# Patient Record
Sex: FEMALE | Race: OTHER | NOT HISPANIC OR LATINO
[De-identification: names, ages, dates, MRNs, and addresses within clinical notes are randomized per-mention and may not be internally consistent; named-entity substitution may affect disease eponyms.]

---

## 2017-06-15 ENCOUNTER — APPOINTMENT (OUTPATIENT)
Dept: OBGYN | Facility: CLINIC | Age: 27
End: 2017-06-15

## 2017-11-14 ENCOUNTER — RESULT REVIEW (OUTPATIENT)
Age: 27
End: 2017-11-14

## 2017-11-14 ENCOUNTER — APPOINTMENT (OUTPATIENT)
Dept: OBGYN | Facility: CLINIC | Age: 27
End: 2017-11-14
Payer: COMMERCIAL

## 2017-11-14 PROCEDURE — 99395 PREV VISIT EST AGE 18-39: CPT

## 2017-11-19 ENCOUNTER — TRANSCRIPTION ENCOUNTER (OUTPATIENT)
Age: 27
End: 2017-11-19

## 2019-01-03 ENCOUNTER — APPOINTMENT (OUTPATIENT)
Dept: OBGYN | Facility: CLINIC | Age: 29
End: 2019-01-03
Payer: COMMERCIAL

## 2019-01-03 ENCOUNTER — RESULT REVIEW (OUTPATIENT)
Age: 29
End: 2019-01-03

## 2019-01-03 PROCEDURE — 99395 PREV VISIT EST AGE 18-39: CPT

## 2020-01-08 ENCOUNTER — APPOINTMENT (OUTPATIENT)
Dept: OBGYN | Facility: CLINIC | Age: 30
End: 2020-01-08

## 2020-03-17 ENCOUNTER — APPOINTMENT (OUTPATIENT)
Dept: OBGYN | Facility: CLINIC | Age: 30
End: 2020-03-17

## 2021-12-17 ENCOUNTER — TRANSCRIPTION ENCOUNTER (OUTPATIENT)
Age: 31
End: 2021-12-17

## 2021-12-18 ENCOUNTER — RESULT REVIEW (OUTPATIENT)
Age: 31
End: 2021-12-18

## 2021-12-18 ENCOUNTER — INPATIENT (INPATIENT)
Facility: HOSPITAL | Age: 31
LOS: 0 days | Discharge: HOME | DRG: 743 | End: 2021-12-18
Attending: OBSTETRICS & GYNECOLOGY | Admitting: OBSTETRICS & GYNECOLOGY
Payer: COMMERCIAL

## 2021-12-18 VITALS
SYSTOLIC BLOOD PRESSURE: 124 MMHG | HEIGHT: 63 IN | DIASTOLIC BLOOD PRESSURE: 76 MMHG | TEMPERATURE: 98 F | HEART RATE: 71 BPM | RESPIRATION RATE: 20 BRPM | WEIGHT: 147.05 LBS | OXYGEN SATURATION: 99 %

## 2021-12-18 VITALS
TEMPERATURE: 98 F | RESPIRATION RATE: 18 BRPM | SYSTOLIC BLOOD PRESSURE: 108 MMHG | HEART RATE: 80 BPM | OXYGEN SATURATION: 100 % | DIASTOLIC BLOOD PRESSURE: 60 MMHG

## 2021-12-18 DIAGNOSIS — N83.209 UNSPECIFIED OVARIAN CYST, UNSPECIFIED SIDE: ICD-10-CM

## 2021-12-18 LAB
ALBUMIN SERPL ELPH-MCNC: 4.4 G/DL — SIGNIFICANT CHANGE UP (ref 3.3–5)
ALP SERPL-CCNC: 57 U/L — SIGNIFICANT CHANGE UP (ref 40–120)
ALT FLD-CCNC: 11 U/L — SIGNIFICANT CHANGE UP (ref 10–45)
ANION GAP SERPL CALC-SCNC: 17 MMOL/L — SIGNIFICANT CHANGE UP (ref 5–17)
APPEARANCE UR: CLEAR — SIGNIFICANT CHANGE UP
APTT BLD: 29.7 SEC — SIGNIFICANT CHANGE UP (ref 27.5–35.5)
AST SERPL-CCNC: 15 U/L — SIGNIFICANT CHANGE UP (ref 10–40)
BACTERIA # UR AUTO: NEGATIVE — SIGNIFICANT CHANGE UP
BASOPHILS # BLD AUTO: 0.04 K/UL — SIGNIFICANT CHANGE UP (ref 0–0.2)
BASOPHILS NFR BLD AUTO: 0.6 % — SIGNIFICANT CHANGE UP (ref 0–2)
BILIRUB SERPL-MCNC: 0.3 MG/DL — SIGNIFICANT CHANGE UP (ref 0.2–1.2)
BILIRUB UR-MCNC: NEGATIVE — SIGNIFICANT CHANGE UP
BLD GP AB SCN SERPL QL: NEGATIVE — SIGNIFICANT CHANGE UP
BUN SERPL-MCNC: 10 MG/DL — SIGNIFICANT CHANGE UP (ref 7–23)
CALCIUM SERPL-MCNC: 9.1 MG/DL — SIGNIFICANT CHANGE UP (ref 8.4–10.5)
CHLORIDE SERPL-SCNC: 103 MMOL/L — SIGNIFICANT CHANGE UP (ref 96–108)
CO2 SERPL-SCNC: 17 MMOL/L — LOW (ref 22–31)
COLOR SPEC: SIGNIFICANT CHANGE UP
CREAT SERPL-MCNC: 0.76 MG/DL — SIGNIFICANT CHANGE UP (ref 0.5–1.3)
DIFF PNL FLD: NEGATIVE — SIGNIFICANT CHANGE UP
EOSINOPHIL # BLD AUTO: 0.09 K/UL — SIGNIFICANT CHANGE UP (ref 0–0.5)
EOSINOPHIL NFR BLD AUTO: 1.3 % — SIGNIFICANT CHANGE UP (ref 0–6)
EPI CELLS # UR: 3 /HPF — SIGNIFICANT CHANGE UP
GLUCOSE SERPL-MCNC: 110 MG/DL — HIGH (ref 70–99)
GLUCOSE UR QL: NEGATIVE — SIGNIFICANT CHANGE UP
HCG SERPL-ACNC: <2 MIU/ML — SIGNIFICANT CHANGE UP
HCG UR QL: NEGATIVE — SIGNIFICANT CHANGE UP
HCT VFR BLD CALC: 39.5 % — SIGNIFICANT CHANGE UP (ref 34.5–45)
HGB BLD-MCNC: 13.2 G/DL — SIGNIFICANT CHANGE UP (ref 11.5–15.5)
HYALINE CASTS # UR AUTO: 0 /LPF — SIGNIFICANT CHANGE UP (ref 0–2)
IMM GRANULOCYTES NFR BLD AUTO: 0.3 % — SIGNIFICANT CHANGE UP (ref 0–1.5)
INR BLD: 1.04 RATIO — SIGNIFICANT CHANGE UP (ref 0.88–1.16)
KETONES UR-MCNC: SIGNIFICANT CHANGE UP
LEUKOCYTE ESTERASE UR-ACNC: NEGATIVE — SIGNIFICANT CHANGE UP
LYMPHOCYTES # BLD AUTO: 2.21 K/UL — SIGNIFICANT CHANGE UP (ref 1–3.3)
LYMPHOCYTES # BLD AUTO: 30.7 % — SIGNIFICANT CHANGE UP (ref 13–44)
MCHC RBC-ENTMCNC: 29.5 PG — SIGNIFICANT CHANGE UP (ref 27–34)
MCHC RBC-ENTMCNC: 33.4 GM/DL — SIGNIFICANT CHANGE UP (ref 32–36)
MCV RBC AUTO: 88.4 FL — SIGNIFICANT CHANGE UP (ref 80–100)
MONOCYTES # BLD AUTO: 0.53 K/UL — SIGNIFICANT CHANGE UP (ref 0–0.9)
MONOCYTES NFR BLD AUTO: 7.4 % — SIGNIFICANT CHANGE UP (ref 2–14)
NEUTROPHILS # BLD AUTO: 4.3 K/UL — SIGNIFICANT CHANGE UP (ref 1.8–7.4)
NEUTROPHILS NFR BLD AUTO: 59.7 % — SIGNIFICANT CHANGE UP (ref 43–77)
NITRITE UR-MCNC: NEGATIVE — SIGNIFICANT CHANGE UP
NRBC # BLD: 0 /100 WBCS — SIGNIFICANT CHANGE UP (ref 0–0)
PH UR: 8 — SIGNIFICANT CHANGE UP (ref 5–8)
PLATELET # BLD AUTO: 249 K/UL — SIGNIFICANT CHANGE UP (ref 150–400)
POTASSIUM SERPL-MCNC: 3.5 MMOL/L — SIGNIFICANT CHANGE UP (ref 3.5–5.3)
POTASSIUM SERPL-SCNC: 3.5 MMOL/L — SIGNIFICANT CHANGE UP (ref 3.5–5.3)
PROT SERPL-MCNC: 7.3 G/DL — SIGNIFICANT CHANGE UP (ref 6–8.3)
PROT UR-MCNC: ABNORMAL
PROTHROM AB SERPL-ACNC: 12.5 SEC — SIGNIFICANT CHANGE UP (ref 10.6–13.6)
RBC # BLD: 4.47 M/UL — SIGNIFICANT CHANGE UP (ref 3.8–5.2)
RBC # FLD: 11.9 % — SIGNIFICANT CHANGE UP (ref 10.3–14.5)
RBC CASTS # UR COMP ASSIST: 2 /HPF — SIGNIFICANT CHANGE UP (ref 0–4)
RH IG SCN BLD-IMP: POSITIVE — SIGNIFICANT CHANGE UP
SARS-COV-2 RNA SPEC QL NAA+PROBE: SIGNIFICANT CHANGE UP
SODIUM SERPL-SCNC: 137 MMOL/L — SIGNIFICANT CHANGE UP (ref 135–145)
SP GR SPEC: 1.02 — SIGNIFICANT CHANGE UP (ref 1.01–1.02)
UROBILINOGEN FLD QL: NEGATIVE — SIGNIFICANT CHANGE UP
WBC # BLD: 7.19 K/UL — SIGNIFICANT CHANGE UP (ref 3.8–10.5)
WBC # FLD AUTO: 7.19 K/UL — SIGNIFICANT CHANGE UP (ref 3.8–10.5)
WBC UR QL: 1 /HPF — SIGNIFICANT CHANGE UP (ref 0–5)

## 2021-12-18 PROCEDURE — 85610 PROTHROMBIN TIME: CPT

## 2021-12-18 PROCEDURE — 96376 TX/PRO/DX INJ SAME DRUG ADON: CPT

## 2021-12-18 PROCEDURE — 76830 TRANSVAGINAL US NON-OB: CPT | Mod: 26

## 2021-12-18 PROCEDURE — 80053 COMPREHEN METABOLIC PANEL: CPT

## 2021-12-18 PROCEDURE — 84702 CHORIONIC GONADOTROPIN TEST: CPT

## 2021-12-18 PROCEDURE — 81025 URINE PREGNANCY TEST: CPT

## 2021-12-18 PROCEDURE — 58662 LAPAROSCOPY EXCISE LESIONS: CPT

## 2021-12-18 PROCEDURE — 74177 CT ABD & PELVIS W/CONTRAST: CPT | Mod: 26,MA

## 2021-12-18 PROCEDURE — 99285 EMERGENCY DEPT VISIT HI MDM: CPT | Mod: 25

## 2021-12-18 PROCEDURE — 85730 THROMBOPLASTIN TIME PARTIAL: CPT

## 2021-12-18 PROCEDURE — 96375 TX/PRO/DX INJ NEW DRUG ADDON: CPT

## 2021-12-18 PROCEDURE — 93010 ELECTROCARDIOGRAM REPORT: CPT

## 2021-12-18 PROCEDURE — 86850 RBC ANTIBODY SCREEN: CPT

## 2021-12-18 PROCEDURE — 36415 COLL VENOUS BLD VENIPUNCTURE: CPT

## 2021-12-18 PROCEDURE — C1889: CPT

## 2021-12-18 PROCEDURE — 87086 URINE CULTURE/COLONY COUNT: CPT

## 2021-12-18 PROCEDURE — 74177 CT ABD & PELVIS W/CONTRAST: CPT | Mod: MA

## 2021-12-18 PROCEDURE — 88305 TISSUE EXAM BY PATHOLOGIST: CPT

## 2021-12-18 PROCEDURE — 93975 VASCULAR STUDY: CPT

## 2021-12-18 PROCEDURE — U0003: CPT

## 2021-12-18 PROCEDURE — 81001 URINALYSIS AUTO W/SCOPE: CPT

## 2021-12-18 PROCEDURE — 76830 TRANSVAGINAL US NON-OB: CPT

## 2021-12-18 PROCEDURE — 85025 COMPLETE CBC W/AUTO DIFF WBC: CPT

## 2021-12-18 PROCEDURE — 96374 THER/PROPH/DIAG INJ IV PUSH: CPT | Mod: XU

## 2021-12-18 PROCEDURE — 86901 BLOOD TYPING SEROLOGIC RH(D): CPT

## 2021-12-18 PROCEDURE — 93975 VASCULAR STUDY: CPT | Mod: 26

## 2021-12-18 PROCEDURE — 86900 BLOOD TYPING SEROLOGIC ABO: CPT

## 2021-12-18 PROCEDURE — 88305 TISSUE EXAM BY PATHOLOGIST: CPT | Mod: 26

## 2021-12-18 RX ORDER — ONDANSETRON 8 MG/1
4 TABLET, FILM COATED ORAL ONCE
Refills: 0 | Status: COMPLETED | OUTPATIENT
Start: 2021-12-18 | End: 2021-12-18

## 2021-12-18 RX ORDER — MORPHINE SULFATE 50 MG/1
4 CAPSULE, EXTENDED RELEASE ORAL ONCE
Refills: 0 | Status: DISCONTINUED | OUTPATIENT
Start: 2021-12-18 | End: 2021-12-18

## 2021-12-18 RX ORDER — SODIUM CHLORIDE 9 MG/ML
1000 INJECTION INTRAMUSCULAR; INTRAVENOUS; SUBCUTANEOUS ONCE
Refills: 0 | Status: COMPLETED | OUTPATIENT
Start: 2021-12-18 | End: 2021-12-18

## 2021-12-18 RX ORDER — OXYCODONE HYDROCHLORIDE 5 MG/1
5 TABLET ORAL ONCE
Refills: 0 | Status: DISCONTINUED | OUTPATIENT
Start: 2021-12-18 | End: 2021-12-18

## 2021-12-18 RX ORDER — OXYCODONE HYDROCHLORIDE 5 MG/1
1 TABLET ORAL
Qty: 5 | Refills: 0
Start: 2021-12-18

## 2021-12-18 RX ORDER — HYDROMORPHONE HYDROCHLORIDE 2 MG/ML
0.5 INJECTION INTRAMUSCULAR; INTRAVENOUS; SUBCUTANEOUS
Refills: 0 | Status: DISCONTINUED | OUTPATIENT
Start: 2021-12-18 | End: 2021-12-18

## 2021-12-18 RX ORDER — ONDANSETRON 8 MG/1
4 TABLET, FILM COATED ORAL ONCE
Refills: 0 | Status: DISCONTINUED | OUTPATIENT
Start: 2021-12-18 | End: 2021-12-18

## 2021-12-18 RX ORDER — ACETAMINOPHEN 500 MG
975 TABLET ORAL ONCE
Refills: 0 | Status: COMPLETED | OUTPATIENT
Start: 2021-12-18 | End: 2021-12-18

## 2021-12-18 RX ADMIN — MORPHINE SULFATE 4 MILLIGRAM(S): 50 CAPSULE, EXTENDED RELEASE ORAL at 15:38

## 2021-12-18 RX ADMIN — SODIUM CHLORIDE 1000 MILLILITER(S): 9 INJECTION INTRAMUSCULAR; INTRAVENOUS; SUBCUTANEOUS at 15:45

## 2021-12-18 RX ADMIN — MORPHINE SULFATE 4 MILLIGRAM(S): 50 CAPSULE, EXTENDED RELEASE ORAL at 13:38

## 2021-12-18 RX ADMIN — MORPHINE SULFATE 4 MILLIGRAM(S): 50 CAPSULE, EXTENDED RELEASE ORAL at 11:29

## 2021-12-18 RX ADMIN — MORPHINE SULFATE 4 MILLIGRAM(S): 50 CAPSULE, EXTENDED RELEASE ORAL at 10:35

## 2021-12-18 RX ADMIN — SODIUM CHLORIDE 1000 MILLILITER(S): 9 INJECTION INTRAMUSCULAR; INTRAVENOUS; SUBCUTANEOUS at 09:51

## 2021-12-18 RX ADMIN — MORPHINE SULFATE 4 MILLIGRAM(S): 50 CAPSULE, EXTENDED RELEASE ORAL at 17:36

## 2021-12-18 RX ADMIN — Medication 975 MILLIGRAM(S): at 09:55

## 2021-12-18 RX ADMIN — ONDANSETRON 4 MILLIGRAM(S): 8 TABLET, FILM COATED ORAL at 09:55

## 2021-12-18 RX ADMIN — Medication 975 MILLIGRAM(S): at 11:29

## 2021-12-18 RX ADMIN — SODIUM CHLORIDE 1000 MILLILITER(S): 9 INJECTION INTRAMUSCULAR; INTRAVENOUS; SUBCUTANEOUS at 15:38

## 2021-12-18 NOTE — ED PROVIDER NOTE - NS ED ATTENDING STATEMENT MOD
no
I have personally performed a face to face diagnostic evaluation on this patient. I have reviewed the ACP note and agree with the history, exam and plan of care, except as noted.

## 2021-12-18 NOTE — H&P ADULT - HISTORY OF PRESENT ILLNESS
R2 GYN ED CONSULT NOTE    SUBJECTIVE:    32yo G0, LMP ~3wks ago, presents with intermittent severe RLQ abdominal pain x 2 days. Patient states that the pain first began on Thursday night after having intercourse. At first the pain was dull and crampy and intermittnet but has progressed to "excruciating pain." Pain is also described as now constant and associated with nausea. No vomiting.      Pt denies fever, chills, vomiting, diarrhea, headache, constipation, dizziness, syncope, chest pain, palpitations, shortness of breath, dysuria, urgency, frequency.    Pt is from [place] and was traveling here to see her grandma.    In ED today patient received a total of 8mg of morphine for the pain.     OBH: G0  GYNH: +PCOS and AUB. denies h/o fibroids,STDs, or abnormal Pap smears  PMH: denies  PSH: wisdom teeth  Meds: OCPs  Social History:  Denies smoking use, drug use,  +occasional social alcohol use      PMSH:  PAST MEDICAL & SURGICAL HISTORY:      Allergies    Levaquin (Faint)    Intolerances        Medications:  Home:  MEDICATIONS  (STANDING):    MEDICATIONS  (PRN):        FAMILY HISTORY:                        OBJECTIVE:    VITAL SIGNS:  Vital Signs Last 24 Hrs  T(C): 36.9 (18 Dec 2021 13:34), Max: 37.3 (18 Dec 2021 11:28)  T(F): 98.5 (18 Dec 2021 13:34), Max: 99.2 (18 Dec 2021 11:28)  HR: 83 (18 Dec 2021 13:34) (67 - 83)  BP: 123/79 (18 Dec 2021 13:34) (101/78 - 124/76)  BP(mean): --  RR: 17 (18 Dec 2021 13:34) (17 - 20)  SpO2: 100% (18 Dec 2021 13:34) (98% - 100%)  Height (cm): 160 (21 @ 09:14)  Weight (kg): 66.7 (21 @ 09:14)  BMI (kg/m2): 26.1 (21 @ 09:14)  BSA (m2): 1.7 (21 @ 09:14)  CAPILLARY BLOOD GLUCOSE            Physical Exam:   General: laying comftorably in bed, NAD   CV: RR S1S2 no m/r/g  Lungs: CTA b/l, good air flow b/l   Abd: Soft, + tenderness in the RLQ, non-distended.  Bowel sounds present.    :  No bleeding on pad.    External labia wnl.  Bimanual exam with right adnexal tenderness.    Ext: non-tender b/l, no edema     LABS:                        13.2   7.19  )-----------( 249      ( 18 Dec 2021 09:58 )             39.5   baso 0.6    eos 1.3    imm gran 0.3    lymph 30.7   mono 7.4    poly 59.7           137  |  103  |  10  ----------------------------<  110<H>  3.5   |  17<L>  |  0.76    Ca    9.1      18 Dec 2021 09:58    TPro  7.3  /  Alb  4.4  /  TBili  0.3  /  DBili  x   /  AST  15  /  ALT  11  /  AlkPhos  57        Urinalysis Basic - ( 18 Dec 2021 10:36 )    Color: Light Yellow / Appearance: Clear / S.022 / pH: x  Gluc: x / Ketone: Trace  / Bili: Negative / Urobili: Negative   Blood: x / Protein: Trace / Nitrite: Negative   Leuk Esterase: Negative / RBC: 2 /hpf / WBC 1 /HPF   Sq Epi: x / Non Sq Epi: 3 /hpf / Bacteria: Negative        RADIOLOGY:  < from: US Doppler Pelvis (21 @ 13:26) >    ACC: [id number] EXAM:  US DPLX PELVIC                        ACC: [id number] EXAM:  US TRANSVAGINAL                          PROCEDURE DATE:  2021          INTERPRETATION:  CLINICAL INFORMATION: Right lower quadrant pain for 2   days.    LMP: 2021.    COMPARISON: CT abdomen and pelvis 2021.    TECHNIQUE:  Endovaginal pelvic sonogram only. Color and Spectral Doppler was   performed.    FINDINGS:    Uterus: 7.8 x 3.4 x 4.3 cm. Within normal limits.  Endometrium: 6 mm. Within normal limits.    Right ovary: 6.7 x 5.0 x 4.9. A 4.4 x 3.2 x 3.8 cm complex cystic lesion   with peripheral echogenic material with convex margins. Normal arterial   and venous waveforms.  Left ovary: 3.6 x 2.8 x 2.2. Within normal limits. Normal arterial and   venous waveforms.    Fluid: Small to moderate.    IMPRESSION:  There is a 4.4 cm right ovarian complex cystic lesion, likely a   hemorrhagic cyst. Six-week follow-up pelvic ultrasound is recommended to   assure resolution.    Small to moderate free fluid in the pelvis.        --- End of Report ---           HARRY MCCOY MD; Resident Radiology  This document has been electronically signed.  EDER HORNER MD; Attending Radiologist  This document has been electronically signed. Dec 18 20599:50PM    < end of copied text >

## 2021-12-18 NOTE — ASU DISCHARGE PLAN (ADULT/PEDIATRIC) - PROVIDER TOKENS
FREE:[LAST:[clinic],PHONE:[(   )    -],FAX:[(   )    -],ADDRESS:[[address] [place] [address], [address] [place] [address].  Phone: [phone]]]

## 2021-12-18 NOTE — ED PROVIDER NOTE - CPE EDP GU NORM
----- Message from Moon Oconnell CNP sent at 8/11/2020  2:18 PM CDT -----  Results reviewed.  - patient should increase atorvastatin to 20 mg nightly and repeat his cholesterol panel in 10-12 weeks. He should also be educated on increasing healthy fat intake including fish, nuts, avocados, extra virgin olive oils, olives, etc since his HDL is 35. Weight loss and exercise and avoidance of refined sugars and processed foods will help lower triglyceride levels.  
I called spoke w pt in regards to his blood work results   He is aware of CNP recommendations    He stated that he has not started the atorvastatin because his wife told him that once he's on that medication, you are on it forever. Pt wanted to know if that is true, and if there is an alternant alfredo medication that he can try    Please assist   
That is his choice of course.  
The patient stated he understands that the atorvastatin is the medication recommendation.  He stated he will change his diet, exercise, and understands to have his cholesterol levels checked.  The lipid panel will be entered for 11/2020. He stated he will have it drawn in November at [facility]..   
The patient's voice message box was full and unable to leave a message.  
Yes this is true and there is not an alternative medication.  
- - -

## 2021-12-18 NOTE — ED PROVIDER NOTE - ATTENDING CONTRIBUTION TO CARE
32yo f hx PCOS on OCPs LMP 3 weeks ago presents with RLQ abd pain x 2 days. States pain was mild that increased with intercourse. Since then pain is sharp and radiates to upper abdomen and flank and intermittent. Pt has been taking Ibuprofen w intermittent relief. Pain recurred this morning, took 2 ibuprofen prior to arrival. +nausea, no vomiting/diarrhea. + flatus. No discharge, mild dysuria. No f/c.   No abd surgery. Family Hx Kidney stones.   On exam, Patient is awake, alert and oriented x 3.  Patient is well appearing and in no acute distress.  NCAT  Neck is supple, No LAD.  Lungs are CTA B/L,+S1S2 no murmurs,  Abdomen:Soft nd/+ RLQ tender +bs no rebound or guarding. No CVAT.  Extremity no edema or calf tender.  Skin with no rash.  Neuro CN3-12 intact. Strength 5/5 in upper and lower extremities. Nml Sensation.Gait normal.   Check labs, US and CT to eval. Pain control and re eval.

## 2021-12-18 NOTE — ED PROVIDER NOTE - PROGRESS NOTE DETAILS
Pt stated feeling better after morphine and she went to CT. Pt went to US. Patient is reassessed, states feeling much better at this time. Pt updated with CT results, patient went to US. RGUJJAIME Pt stated the pain's back and unable to bear it now. VSS. Morphine given. GYN at bedside.

## 2021-12-18 NOTE — ASU DISCHARGE PLAN (ADULT/PEDIATRIC) - NS MD DC FALL RISK RISK
For information on Fall & Injury Prevention, visit: [email or web address] OR  [email or web address] OR  https://www.cdc.gov/steadi/patient.html

## 2021-12-18 NOTE — H&P ADULT - ATTENDING COMMENTS
ATTG note    Pt seen with and agree with above PGY2 note    32 yo P0 LMP 3 wks ago presents to ED for pelvic pain x 3 days.  Pain was mild and controllable with po pain meds, exacerbated with intercourse, and has become progressively worse today.  Pt required Morphine in ED.  Pt rates pain going from 7 at its best and then 11 at its worse.  GYN hx s/f PCOS on OCPs.  Follows with GYN in [place].  Pt traveled to see family in [place] today.    VSS, afebrile  Gen-NAD at this time  ABd-soft, nd, TTP, no rebound and no guarding  Pelvic exam - as above    labs-  WBC-7  H/H-13/39  UCG neg  COVID neg  CT abd/pelvis - normal appendix, rigth adnexa - 6.6 x 4.4 cm  TVS-Right adnexa - 6.7, complex cyst 4.4 cm  normal flow bilaterally  Small to moderate free fluid    a/p:32 yo P0 with acute onset of pelvic pain that was intermittent and controlled with po pain meds now with worsening pain that is 11 at its worst with large right adnexa - clinical picture concerning for torsion due to pain requiring narcotics and with large adnexal mass.  d/w pt that only way to definitively dx would be surgically with Dx LSC.  Pt amenable to surgical intervention.  Pt consented for and risks d/w pt for Dx LSC, detorsion, possible cystectomy, possible oopherectomy, possible ex-lap - risks including bleeding, infection, injury to surrounding blood vessels or organs like intestines, bladder, accepts blood.  All questions answered.  Informed consent signed.  -OR for Dx LSC  -NPO  -IVF  -Type on hold  -Dispo-for dc to PACU and then home    ANA Castellano

## 2021-12-18 NOTE — BRIEF OPERATIVE NOTE - NSICDXBRIEFPROCEDURE_GEN_ALL_CORE_FT
PROCEDURES:  Laparoscopic detorsion of ovary 18-Dec-2021 21:11:39  Kapil Ruiz  Laparoscopic left ovarian cystectomy 18-Dec-2021 21:11:58  Kapil Ruiz   PROCEDURES:  Laparoscopic detorsion of ovary 18-Dec-2021 21:11:39  Kapil Ruiz  Laparoscopic right ovarian cystectomy 21-Dec-2021 10:33:35  Kapil Ruiz

## 2021-12-18 NOTE — PRE-ANESTHESIA EVALUATION ADULT - NSANTHPMHFT_GEN_ALL_CORE
31F hx of PCOS found to have right ovarian torsion. Ynesly denies n/v, abd pain after medication. Denies CP, SOB

## 2021-12-18 NOTE — BRIEF OPERATIVE NOTE - OPERATION/FINDINGS
LSC: hemostatic entry  Pelvic survey: ovarian torsion x 2. +2-3cm simple left cyst with clear fluid. Ovary was noted to be purple and edematous prior to detorsion, marked improvement in color prior to the end of the case. Dilated left fallopian tube that was well perfused appearing. Grossly normal uterus, right fallopian tube, and right ovary.  Abd survey: grossly normal omentum, liver edge, and small bowel. LSC: hemostatic entry  Pelvic survey: ovarian torsion x 2. +2-3cm simple right cyst with clear fluid. Ovary was noted to be purple and edematous prior to detorsion, marked improvement in color prior to the end of the case. Dilated right fallopian tube that was well perfused appearing. Grossly normal uterus, right fallopian tube, and right ovary.  Abd survey: grossly normal omentum, liver edge, and small bowel.

## 2021-12-18 NOTE — CHART NOTE - NSCHARTNOTEFT_GEN_A_CORE
Gyn Post Op Check    Patient seen and examined at bedside, recently post-op. No acute complaints at this time. Denies CP, SOB, N/V, fevers, and chills.    Vital Signs Last 24 Hours  T(C): 36.5 (12-18-21 @ 23:00), Max: 37.4 (12-18-21 @ 17:21)  HR: 80 (12-18-21 @ 23:00) (67 - 118)  BP: 108/60 (12-18-21 @ 23:00) (101/78 - 130/70)  RR: 18 (12-18-21 @ 23:00) (17 - 20)  SpO2: 100% (12-18-21 @ 23:00) (97% - 100%)    I&O's Summary    18 Dec 2021 07:01  -  18 Dec 2021 23:25  --------------------------------------------------------  IN: 486 mL / OUT: 250 mL / NET: 236 mL    Physical Exam:  General: NAD  CV: NR, RR, S1, S2, no M/R/G  Lungs: CTA-B  Abdomen: Soft, appropriately tender, non-distended, +BS  Incision: 3 port site incision, CDI  Ext: No pain or swelling    Labs:             13.2   7.19  )-----------( 249      ( 12-18 @ 09:58 )             39.5     MEDICATIONS  (PRN):  HYDROmorphone  Injectable 0.5 milliGRAM(s) IV Push every 10 minutes PRN Moderate Pain (4 - 6)  ondansetron Injectable 4 milliGRAM(s) IV Push once PRN Nausea and/or Vomiting  oxyCODONE    IR 5 milliGRAM(s) Oral once PRN Moderate Pain (4 - 6)    32 yo G0 presenting w/ abdominal pain taken to OR for ovarian detorsion and cystectomy    Neuro: c/w tylenol, motrin  CV: VSS  Pulm: Saturating well on room air, encourage incentive spirometry  GI: regular diet  : spontaneously voiding  Heme: early ambulation  Dispo: Continue routine post-op care, clear for d/c home    d/w Dr. Esquivel  [facility] PGY3

## 2021-12-18 NOTE — ED PROVIDER NOTE - GENITOURINARY BLADDER
Brown PGY2: Informed that Dr Vivas admits to Dr Nixon, called Dr Nixon and pt accepted, pt pending cards eval- want to assess lasix in setting of aortic stenosis w/ cards prior to tx, pt stable, sats ok on ra and pt not in any resp distress , pending repeat trop non-tender/non-distended spoke to Dr Domingo who will consult on behalf of Dr Vivas, called HF team to assess bc she has been seen by Dr Kendrick in the past

## 2021-12-18 NOTE — ED PROVIDER NOTE - OBJECTIVE STATEMENT
32yo female pt with PMHx of PCOS (no med), no PSHx presents to ED with RLQ pain with intermittent nausea for 2days. Pt stated she noticed mild pain to right pelvic area intermittently 2days ago and the pain's gradually been worse with radiating pain to RLQ/flank area. Denies fever, chills, cough or congestion. Denies urinary problems. Denies vaginal discharge or bleeding. LMP- 3 weeks ago. Denies CP/SOB. +Family hx of Kidney stone.

## 2021-12-18 NOTE — H&P ADULT - ASSESSMENT
31y G0 here w/ c/o  RLQ pain with TVUS showing a      sided  4.4 cm ovarian cyst.  Blood flow demonstrated on US.  Patient currently with RLQ pain.  Given history of  severe pain along with nausea and 4.4 cm size of cyst on imaging will admit to GYN for laprascopic cystectomy in setting of suspected ovarian torsion.     -Admit to GYN  -send T&S  -NPO  -consents signed    Joseph PGY2  Pt seen and discussed with Dr. Castellano

## 2021-12-18 NOTE — ED ADULT NURSE NOTE - OBJECTIVE STATEMENT
31y female with hx of pcos presents to the ER c/o abd pain. pt is alert and oriented x 4 and speaking coherently. pt states she has had several days of RLQ pain. pain is constant, intermittently worsening. abd soft, non distended. pt tender to palpation in RLQ. Pt states she had pain with intercourse last week which is not usual for her. Pt vss, denies any vaginal bleeding, abd vaginal discharge, cp, sob, vomiting, fevers. will reassess.

## 2021-12-18 NOTE — ASU DISCHARGE PLAN (ADULT/PEDIATRIC) - CARE PROVIDER_API CALL
[facility],   [address] [place] [address], [address] [place] [address].  Phone: [phone]  Phone: (   )    -  Fax: (   )    -  Follow Up Time:

## 2021-12-18 NOTE — PRE-ANESTHESIA EVALUATION ADULT - NSANTHOSAYNRD_GEN_A_CORE
No. GREGG screening performed.  STOP BANG Legend: 0-2 = LOW Risk; 3-4 = INTERMEDIATE Risk; 5-8 = HIGH Risk

## 2021-12-19 LAB
CULTURE RESULTS: SIGNIFICANT CHANGE UP
SPECIMEN SOURCE: SIGNIFICANT CHANGE UP

## 2021-12-20 NOTE — CHART NOTE - NSCHARTNOTEFT_GEN_A_CORE
Received notification from GYN clinic that patient called the office asking to speak to a provider regarding her recent surgery. Patient underwent LSC L ovarian cystectomy and detorsion on 12/18/21. Since the procedure, she has noticed some bruising on the left side of her abdomen and pelvis. She denies any swelling, and pain is managed well with PRN PO analgesics. She has not noticed any bleeding or discharge from the surgical skin incisions. She denies fevers, chills, or any other concerns. Recommended that patient apply ice packs to facilitate improvement in current symptoms. Instructed patient to call the office if symptoms persist despite PO analgesics and supportive care w/ ice packs. Emergency return precautions also discussed in detail. Patient verbalized understanding. All questions answered thoroughly and to patient's satisfaction. Patient is to schedule a postoperative appointment with her private OBGYN in [place] in 2 weeks.     Tonya Zepeda PGY2

## 2021-12-29 LAB — SURGICAL PATHOLOGY STUDY: SIGNIFICANT CHANGE UP

## 2022-01-10 ENCOUNTER — APPOINTMENT (OUTPATIENT)
Dept: OBGYN | Facility: CLINIC | Age: 32
End: 2022-01-10
Payer: COMMERCIAL

## 2022-01-10 VITALS
WEIGHT: 154 LBS | HEIGHT: 65 IN | SYSTOLIC BLOOD PRESSURE: 121 MMHG | BODY MASS INDEX: 25.66 KG/M2 | DIASTOLIC BLOOD PRESSURE: 83 MMHG

## 2022-01-10 DIAGNOSIS — Z78.9 OTHER SPECIFIED HEALTH STATUS: ICD-10-CM

## 2022-01-10 PROCEDURE — 99024 POSTOP FOLLOW-UP VISIT: CPT

## 2022-01-10 NOTE — PHYSICAL EXAM
[Appropriately responsive] : appropriately responsive [Alert] : alert [No Acute Distress] : no acute distress [Oriented x3] : oriented x3 [Soft] : soft [Non-tender] : non-tender [Non-distended] : non-distended [FreeTextEntry7] : Incisions x3 healing well

## 2022-01-10 NOTE — PLAN
[FreeTextEntry1] : 30 y/o G0 LMP 12/20  presents for follow up after laparoscopic ovarian cystectomy 12/18\par \par -healing well\par -discussed precautions for sexual intercourse\par -Pt thinking of trying to get pregnant in the next year, discussed starting PNV

## 2022-01-10 NOTE — HISTORY OF PRESENT ILLNESS
[Patient reported PAP Smear was normal] : Patient reported PAP Smear was normal [FreeTextEntry1] : 30 y/o G0 LMP 12/20  presents for follow up. \par \par Pt was seen in the [facility] ED on 12/18 with RLQ pain, went to OR due to concern for torsion. Underwent laparoscopic right ovarian detorsion and cystectomy. Pt has been doing well since procedure. \par \par Pt had intercourse 2.5 weeks after procedure and had cramping afterwards. Improved with ibuprofen. \par \par \par PGYN: \par +PCOS, AUB \par \par Lives in [place] but visits [place] every other week for work\par Works as a  for [employer]\par COVID vaccinated and boosted [PapSmeardate] : 2019

## 2022-03-16 NOTE — ED PROVIDER NOTE - IV ALTEPLASE EXCL ABS HIDDEN
[Alert] : alert [No Acute Distress] : no acute distress [Well Developed] : well developed [Normal Sclera/Conjunctiva] : normal sclera/conjunctiva [EOMI] : extra ocular movement intact [PERRL] : pupils equal, round and reactive to light [No Proptosis] : no proptosis [No Lid Lag] : no lid lag [No Neck Mass] : no neck mass was observed [Thyroid Not Enlarged] : the thyroid was not enlarged [No Respiratory Distress] : no respiratory distress [Clear to Auscultation] : lungs were clear to auscultation bilaterally [No Murmurs] : no murmurs [Regular Rhythm] : with a regular rhythm [No Edema] : no peripheral edema [Pedal Pulses Normal] : the pedal pulses are present [Normal Bowel Sounds] : normal bowel sounds [Not Tender] : non-tender [Not Distended] : not distended [Soft] : abdomen soft [Normal Supraclavicular Nodes] : no supraclavicular lymphadenopathy [Normal Anterior Cervical Nodes] : no anterior cervical lymphadenopathy show [Normal Posterior Cervical Nodes] : no posterior cervical lymphadenopathy [Normal Gait] : normal gait [No Joint Swelling] : no joint swelling seen [No Rash] : no rash [No Skin Lesions] : no skin lesions [No Tremors] : no tremors [Oriented x3] : oriented to person, place, and time [Normal Affect] : the affect was normal [Normal Mood] : the mood was normal [de-identified] : Palpated RMLobe ~1 cm nodule, L M/L Lobe 2 nodules ~1.5 cm [de-identified] : Tachycardic 105 b/min

## 2022-08-04 ENCOUNTER — EMERGENCY (EMERGENCY)
Facility: HOSPITAL | Age: 32
LOS: 1 days | Discharge: HOME | End: 2022-08-04
Attending: EMERGENCY MEDICINE
Payer: COMMERCIAL

## 2022-08-04 VITALS
DIASTOLIC BLOOD PRESSURE: 71 MMHG | OXYGEN SATURATION: 100 % | RESPIRATION RATE: 18 BRPM | SYSTOLIC BLOOD PRESSURE: 104 MMHG | TEMPERATURE: 99 F | HEART RATE: 80 BPM

## 2022-08-04 VITALS
OXYGEN SATURATION: 98 % | HEART RATE: 87 BPM | SYSTOLIC BLOOD PRESSURE: 135 MMHG | WEIGHT: 164.91 LBS | TEMPERATURE: 98 F | HEIGHT: 63 IN | RESPIRATION RATE: 16 BRPM | DIASTOLIC BLOOD PRESSURE: 81 MMHG

## 2022-08-04 LAB
ALBUMIN SERPL ELPH-MCNC: 4.7 G/DL — SIGNIFICANT CHANGE UP (ref 3.3–5)
ALP SERPL-CCNC: 76 U/L — SIGNIFICANT CHANGE UP (ref 40–120)
ALT FLD-CCNC: 18 U/L — SIGNIFICANT CHANGE UP (ref 10–45)
ANION GAP SERPL CALC-SCNC: 14 MMOL/L — SIGNIFICANT CHANGE UP (ref 5–17)
APPEARANCE UR: CLEAR — SIGNIFICANT CHANGE UP
AST SERPL-CCNC: 23 U/L — SIGNIFICANT CHANGE UP (ref 10–40)
BACTERIA # UR AUTO: NEGATIVE — SIGNIFICANT CHANGE UP
BASOPHILS # BLD AUTO: 0.06 K/UL — SIGNIFICANT CHANGE UP (ref 0–0.2)
BASOPHILS NFR BLD AUTO: 0.7 % — SIGNIFICANT CHANGE UP (ref 0–2)
BILIRUB SERPL-MCNC: 0.2 MG/DL — SIGNIFICANT CHANGE UP (ref 0.2–1.2)
BILIRUB UR-MCNC: NEGATIVE — SIGNIFICANT CHANGE UP
BLD GP AB SCN SERPL QL: NEGATIVE — SIGNIFICANT CHANGE UP
BUN SERPL-MCNC: 13 MG/DL — SIGNIFICANT CHANGE UP (ref 7–23)
CALCIUM SERPL-MCNC: 9.5 MG/DL — SIGNIFICANT CHANGE UP (ref 8.4–10.5)
CHLORIDE SERPL-SCNC: 102 MMOL/L — SIGNIFICANT CHANGE UP (ref 96–108)
CO2 SERPL-SCNC: 20 MMOL/L — LOW (ref 22–31)
COLOR SPEC: SIGNIFICANT CHANGE UP
CREAT SERPL-MCNC: 0.82 MG/DL — SIGNIFICANT CHANGE UP (ref 0.5–1.3)
DIFF PNL FLD: NEGATIVE — SIGNIFICANT CHANGE UP
EGFR: 99 ML/MIN/1.73M2 — SIGNIFICANT CHANGE UP
EOSINOPHIL # BLD AUTO: 0.06 K/UL — SIGNIFICANT CHANGE UP (ref 0–0.5)
EOSINOPHIL NFR BLD AUTO: 0.7 % — SIGNIFICANT CHANGE UP (ref 0–6)
EPI CELLS # UR: 1 /HPF — SIGNIFICANT CHANGE UP
GLUCOSE SERPL-MCNC: 88 MG/DL — SIGNIFICANT CHANGE UP (ref 70–99)
GLUCOSE UR QL: NEGATIVE — SIGNIFICANT CHANGE UP
HCG SERPL-ACNC: 515.3 MIU/ML — HIGH
HCT VFR BLD CALC: 40.2 % — SIGNIFICANT CHANGE UP (ref 34.5–45)
HGB BLD-MCNC: 13.6 G/DL — SIGNIFICANT CHANGE UP (ref 11.5–15.5)
HYALINE CASTS # UR AUTO: 0 /LPF — SIGNIFICANT CHANGE UP (ref 0–7)
IMM GRANULOCYTES NFR BLD AUTO: 0.2 % — SIGNIFICANT CHANGE UP (ref 0–1.5)
KETONES UR-MCNC: ABNORMAL
LEUKOCYTE ESTERASE UR-ACNC: NEGATIVE — SIGNIFICANT CHANGE UP
LYMPHOCYTES # BLD AUTO: 2.52 K/UL — SIGNIFICANT CHANGE UP (ref 1–3.3)
LYMPHOCYTES # BLD AUTO: 27.8 % — SIGNIFICANT CHANGE UP (ref 13–44)
MAGNESIUM SERPL-MCNC: 1.9 MG/DL — SIGNIFICANT CHANGE UP (ref 1.6–2.6)
MCHC RBC-ENTMCNC: 30.3 PG — SIGNIFICANT CHANGE UP (ref 27–34)
MCHC RBC-ENTMCNC: 33.8 GM/DL — SIGNIFICANT CHANGE UP (ref 32–36)
MCV RBC AUTO: 89.5 FL — SIGNIFICANT CHANGE UP (ref 80–100)
MONOCYTES # BLD AUTO: 0.75 K/UL — SIGNIFICANT CHANGE UP (ref 0–0.9)
MONOCYTES NFR BLD AUTO: 8.3 % — SIGNIFICANT CHANGE UP (ref 2–14)
NEUTROPHILS # BLD AUTO: 5.66 K/UL — SIGNIFICANT CHANGE UP (ref 1.8–7.4)
NEUTROPHILS NFR BLD AUTO: 62.3 % — SIGNIFICANT CHANGE UP (ref 43–77)
NITRITE UR-MCNC: NEGATIVE — SIGNIFICANT CHANGE UP
NRBC # BLD: 0 /100 WBCS — SIGNIFICANT CHANGE UP (ref 0–0)
PH UR: 6 — SIGNIFICANT CHANGE UP (ref 5–8)
PLATELET # BLD AUTO: 236 K/UL — SIGNIFICANT CHANGE UP (ref 150–400)
POTASSIUM SERPL-MCNC: 3.5 MMOL/L — SIGNIFICANT CHANGE UP (ref 3.5–5.3)
POTASSIUM SERPL-SCNC: 3.5 MMOL/L — SIGNIFICANT CHANGE UP (ref 3.5–5.3)
PROT SERPL-MCNC: 7.5 G/DL — SIGNIFICANT CHANGE UP (ref 6–8.3)
PROT UR-MCNC: NEGATIVE — SIGNIFICANT CHANGE UP
RBC # BLD: 4.49 M/UL — SIGNIFICANT CHANGE UP (ref 3.8–5.2)
RBC # FLD: 11.9 % — SIGNIFICANT CHANGE UP (ref 10.3–14.5)
RBC CASTS # UR COMP ASSIST: 4 /HPF — SIGNIFICANT CHANGE UP (ref 0–4)
RH IG SCN BLD-IMP: POSITIVE — SIGNIFICANT CHANGE UP
SODIUM SERPL-SCNC: 136 MMOL/L — SIGNIFICANT CHANGE UP (ref 135–145)
SP GR SPEC: 1.02 — SIGNIFICANT CHANGE UP (ref 1.01–1.02)
UROBILINOGEN FLD QL: NEGATIVE — SIGNIFICANT CHANGE UP
WBC # BLD: 9.07 K/UL — SIGNIFICANT CHANGE UP (ref 3.8–10.5)
WBC # FLD AUTO: 9.07 K/UL — SIGNIFICANT CHANGE UP (ref 3.8–10.5)
WBC UR QL: 1 /HPF — SIGNIFICANT CHANGE UP (ref 0–5)

## 2022-08-04 PROCEDURE — 99213 OFFICE O/P EST LOW 20 MIN: CPT

## 2022-08-04 PROCEDURE — 81001 URINALYSIS AUTO W/SCOPE: CPT

## 2022-08-04 PROCEDURE — 86901 BLOOD TYPING SEROLOGIC RH(D): CPT

## 2022-08-04 PROCEDURE — 99285 EMERGENCY DEPT VISIT HI MDM: CPT

## 2022-08-04 PROCEDURE — 84702 CHORIONIC GONADOTROPIN TEST: CPT

## 2022-08-04 PROCEDURE — 80053 COMPREHEN METABOLIC PANEL: CPT

## 2022-08-04 PROCEDURE — 93975 VASCULAR STUDY: CPT

## 2022-08-04 PROCEDURE — 83735 ASSAY OF MAGNESIUM: CPT

## 2022-08-04 PROCEDURE — 87086 URINE CULTURE/COLONY COUNT: CPT

## 2022-08-04 PROCEDURE — 76817 TRANSVAGINAL US OBSTETRIC: CPT

## 2022-08-04 PROCEDURE — 93975 VASCULAR STUDY: CPT | Mod: 26

## 2022-08-04 PROCEDURE — 85025 COMPLETE CBC W/AUTO DIFF WBC: CPT

## 2022-08-04 PROCEDURE — 86850 RBC ANTIBODY SCREEN: CPT

## 2022-08-04 PROCEDURE — 86900 BLOOD TYPING SEROLOGIC ABO: CPT

## 2022-08-04 PROCEDURE — 76817 TRANSVAGINAL US OBSTETRIC: CPT | Mod: 26

## 2022-08-04 PROCEDURE — 99284 EMERGENCY DEPT VISIT MOD MDM: CPT | Mod: 25

## 2022-08-04 NOTE — ED PROVIDER NOTE - PATIENT PORTAL LINK FT
You can access the FollowMyHealth Patient Portal offered by [facility] by registering at the following website: [email or web address]. By joining [employer]’s FollowMyHealth portal, you will also be able to view your health information using other applications (apps) compatible with our system. You can access the FollowMyHealth Patient Portal offered by [facility] by registering at the following website: [email or web address]. By joining [employer]’s FollowMyHealth portal, you will also be able to view your health information using other applications (apps) compatible with our system.

## 2022-08-04 NOTE — ED PROVIDER NOTE - DOMESTIC TRAVEL HIGH RISK QUESTION
Detail Level: Simple Detail Level: Generalized No Sunscreen Recommendations: Discussed importance of regular photo protection with sun screen and photo protective clothing Detail Level: Zone Sunscreen Recommendations: Discussed importance of regular photo protection with zinc-based sunscreen and photo protective clothing.

## 2022-08-04 NOTE — ED PROVIDER NOTE - NSFOLLOWUPINSTRUCTIONS_ED_ALL_ED_FT
Today you were seen in the ED for vaginal discharge.    It was found that you have a pregnancy of unknown location.     Please follow up with the OBGYN, information for such can be found below. You either need to go to the OBGYN office or return to the ED in 48 hours.    Please return to the ED if you have any of the following:   You have developed new onset bleeding that is more than one pad an hour. you have dizziness, lightheadedness, nausea, vomiting.

## 2022-08-04 NOTE — ED PROVIDER NOTE - OBJECTIVE STATEMENT
32F  PMH PCOS PSH ovarian cystectomy and detorsion presenting with 4 days of brown spotting and b/l abdominal cramping. LMP 22. Pt with positive home pregnancy and serum pregnancy, no TVUS performed yet. Pt lives in [place], her OBGYN is in [place]. Denies fever, chills, dysuria, hematuria, diarrhea, n/v, chest pain, SOB. Denies abnormal vaginal discharge.

## 2022-08-04 NOTE — ED PROVIDER NOTE - ATTENDING CONTRIBUTION TO CARE
Attending MD Rivera:  I personally have seen and examined this patient. I have performed a substantive portion of the visit including all aspects of the medical decision making.  Resident note reviewed and agree on plan of care and except where noted.      32F  @5wks presenting with pelvic cramping and brown vaginal spotting. Pt well appearing, nontender abdomen. Plan for pelvic examination, TVUS, serum hcg, reassess.         *The above represents an initial assessment/impression. Please refer to progress notes for potential changes in patient clinical course*

## 2022-08-04 NOTE — ED PROVIDER NOTE - CLINICAL SUMMARY MEDICAL DECISION MAKING FREE TEXT BOX
32F  PMH PCOS PSH ovarian cystectomy and detorsion presenting with 4 days of brown spotting and b/l abdominal cramping. Given hx and physical, ddx includes but is not limited to ectopic vs IUP vs spont  vs UTI. Pt abd soft, NT, low concern for ruptured ectopic. Low concern for anemia given minimal blood loss and asymptomatic. Plan for UA, labs, TVUS, reassess, likely dc with OBGYN f/u

## 2022-08-04 NOTE — CONSULT NOTE ADULT - SUBJECTIVE AND OBJECTIVE BOX
JOHNNY GRIMM  32y  Female [id number]    HPI:  33yo  LMP  presenting with "twinge in abdomen like a pulling sensation". Pt reports brown spotting, no heavy bleeding.     Pt lives in [place], here visiting family. She called her Gyn with +UPT , who gave her appt  for this pregnancy. This is a planned and desired pregnancy. Pt reports having blood work  with . HCG today 515. Pt had not yet had US in this pregnancy. Denies any other sx. Denies nausea, vomiting, fevers, chills. Denies SOB or fever.     Name of GYN Physician: [place] of state  POB:  P0  Pgyn: Denies fibroids, cysts, endometriosis, STI's, Abnormal pap smears   Home meds: None  Allergies: Levaquin (Faint)  PMSHx: PCOS       Vital Signs Last 24 Hrs  T(C): 36.9 (04 Aug 2022 18:17), Max: 36.9 (04 Aug 2022 15:42)  T(F): 98.5 (04 Aug 2022 18:17), Max: 98.5 (04 Aug 2022 15:42)  HR: 99 (04 Aug 2022 18:17) (87 - 99)  BP: 123/85 (04 Aug 2022 18:17) (123/85 - 135/81)  BP(mean): --  RR: 18 (04 Aug 2022 18:17) (16 - 18)  SpO2: 100% (04 Aug 2022 18:17) (98% - 100%)    Parameters below as of 04 Aug 2022 18:17  Patient On (Oxygen Delivery Method): room air    Physical Exam:   General: sitting comftorably in bed, NAD   Lungs: CTA b/l, good air flow b/l   Abd: Soft, non-tender, non-distended.   :  No bleeding on pad.    External labia wnl.  Bimanual exam with no cervical motion tenderness, uterus wnl, adnexa non palpable b/l.  Cervix closed.  Ext: non-tender b/l, no edema     LABS:                              13.6   9.07  )-----------( 236      ( 04 Aug 2022 18:26 )             40.2     08-04    136  |  102  |  13  ----------------------------<  88  3.5   |  20<L>  |  0.82    Ca    9.5      04 Aug 2022 18:26  Mg     1.9     08-    TPro  7.5  /  Alb  4.7  /  TBili  0.2  /  DBili  x   /  AST  23  /  ALT  18  /  AlkPhos  76  08-04    I&O's Detail      Urinalysis Basic - ( 04 Aug 2022 19:46 )    Color: Light Yellow / Appearance: Clear / S.024 / pH: x  Gluc: x / Ketone: Small  / Bili: Negative / Urobili: Negative   Blood: x / Protein: Negative / Nitrite: Negative   Leuk Esterase: Negative / RBC: 4 /hpf / WBC 1 /HPF   Sq Epi: x / Non Sq Epi: 1 /hpf / Bacteria: Negative    HCG Quantitative, Serum (22 @ 18:26)    HCG Quantitative, Serum: 515.3: HCG-Quantitative test interpretations: This test is intended only for the  detection & monitoring of pregnancy. For oncology studies order the tumor  marker test “HCG-TM” (hCG-Tumor Marker).  For pregnancy evaluation the reference values are as follows:  Negative:  <=4 mIU/mL  Indeterminate:  5 - 25 mIU/mL (suggest repeat testing in 72 hours)  Positive:  > 25 mIU/mL  Reference Values during Pregnancy:  Weeks after LMP* REFERENCE INTERVAL mIU/mL     3      6 - 71     4      10 - 750     5      220 - 7,100     6      160 - 32,000     7      3,700 - 164,000     8      32,000 - 150,000     9      64,000 - 151,000     10      47,000 - 187,000     12      28,000 - 211,000     14      14,000 - 63,000     15      12,000 - 71,000     16 - 18  8,000 - 58,000  * LMP:  Last Menstrual Period  HCG results of false positive and false negative for pregnancy are rare,  but can occur with this, and other, hCG tests. Coby- and post-menopausal  females may have mildly elevated hCG concentrations usually less than 14  mIU/mL that are constant over time. mIU/mL      Type + Screen (22 @ 18:26)    ABO Interpretation: O    Rh Interpretation: Positive    Antibody Screen: Negative        RADIOLOGY & ADDITIONAL STUDIES:    < from: US Transvaginal, OB (22 @ 20:58) >    ACC: [id number] EXAM:  US DPLX PELVIC                        ACC: [id number] EXAM:  US OB TRANSVAGINAL                          PROCEDURE DATE:  2022          INTERPRETATION:  CLINICAL INFORMATION: pregnant with vaginal bleeding,   abdominal cramping, Evaluate for ectopic versus intrauterine   pregnancy.bHCG 515    LMP: 2022    Estimated Gestational Age by LMP: 4 weeks 6 days.    COMPARISON: US of 21    Endovaginal pelvic sonogram as per order. Transabdominal pelvic sonogram   wasalso performed as part of our standard protocol. Color and Spectral   Doppler was performed.    FINDINGS:  Uterus: 8.5 x 3.8 x 5.3 cm. No intrauterine gestation.  Cervix is closed.  Endometrium: 1.1 cm. Trace fluid with low-level echoes.    Right ovary: 4.5 x 2.5 x 3.1 cm. Within normal limits. A 2.1 cm   hemorrhagic corpus luteal cyst. Normal arterial and venous waveforms.    Left ovary: 2.8 x 1.2 x 2.3 cm. Within normal limits. Normal arterial and   venous waveforms.    Fluid: Mild volume of complex fluid within the adnexa and cul-de-sac.    IMPRESSION:    Neither an intrauterine nor extrauterine gestation is identified. This   represents a pregnancy of indeterminate location.  No massive   hemoperitoneum or suspicious adnexal mass.  Differential diagnosis   includes early normal IUP, pregnancy failure or ectopic pregnancy. Serial   hCG and ultrasound are recommended to determine the significance of these   findings.          --- End of Report ---           KAROLYN STOVER MD; Resident Radiologist  This document has been electronically signed.  LAYA MARTINEZ MD; Attending Radiologist  This document has been electronically signed. Aug  4 2022  9:51PM    < end of copied text >

## 2022-08-04 NOTE — ED PROVIDER NOTE - NSFOLLOWUPCLINICS_GEN_ALL_ED_FT
[facility] - Ambulatory Care Clinic  OB/GYN & Surg  [address] [place] [address]  Phone: [phone]  Fax:     [facility] Gynecology and Obstetrics  Gynceology/OB  [address] [place] [address]  Phone: [phone]  Fax:      [facility] - Ambulatory Care Clinic  OB/GYN & Surg  [address] [place] [address]  Phone: [phone]  Fax:     [facility] Gynecology and Obstetrics  Gynceology/OB  [address] [place] [address]  Phone: [phone]  Fax:

## 2022-08-04 NOTE — ED PROVIDER NOTE - PHYSICAL EXAMINATION
GENERAL: Awake. Alert. NAD. Well nourished.  HEENT: NC/AT, Conjunctiva pink, no scleral icterus. Airway patent. Moist mucous membranes.  LUNGS: CTAB. No wheezes or rales noted.  CARDIAC: Chest non-tender to palpation. RRR.  ABDOMEN: No masses noted. Soft, NT, ND, no rebound, no guarding.  EXT: No edema, no calf tenderness, distal pulses 2+ bilaterally  NEURO: A&Ox3. Moving all extremities. Sensation and strength intact throughout.   SKIN: Warm and dry.  PSYCH: Normal affect.     Pelvic Exam: Chaperoned by Whitney NORIEGA. No rashes or lesions noted on external exam. No purulent discharge noted. Cervix visualized, os closed, no active bleeding noted, physiologic discharge noted. No adnexal or cervical motion tenderness.

## 2022-08-04 NOTE — ED ADULT NURSE REASSESSMENT NOTE - NS ED NURSE REASSESS COMMENT FT1
Pt received from [facility] [employer] in gold, A&Ox3, breathing spontaneously, unlabored & w/o distress on room air. Pt has no complaints at this time and is OK to go to ultrasound. Pt urine collected and sent as ordered. Pt awaits imaging.

## 2022-08-04 NOTE — CONSULT NOTE ADULT - ASSESSMENT
A/P   32y   LMP  presents with c/o slight abdominal pain found to have a b-HCG of 515.  Differenital includes threatened AB vs. ectopic pregnancy vs. viable IUP vs. spontanous  in progress.  Difficult to assess at this time.      -patient to follow up in 48 hours for repeat b-HCG in ER  -Rh type: O+ .    No need for rhogam at this time.   -Ectopic precautions reviewed with patient.  Discussed with patient importance of follow up for b-HCG given unknown pregnancy location at this time.  Patient expressed understanding.  All questions and concerns addressed to patient's apparent satisfaction.   -patient to be added to GYN b-HCG list for closer follow up.    -patient stable for d/c home from GYN perspective.  Primary managment per ED team.      d/w Dr Michael Reese PGY4

## 2022-08-04 NOTE — CONSULT NOTE ADULT - TIME BILLING
/Attendiny/o , LMP 22, presents to ED with c/o "twinge in abdomen" and vaginal spotting.    Pt discussed with me and chart reviewed.  I agree with the above Resident's assessment and plan.    Pt presents to Ed with c/o a "pulling sensation" as above and vaginal spotting; pt denies any other complaints. Pt visiting from [place] and was had a+UPT on .  Pt had blood work done on  with HCG of 213.  Hx of PCOS., otherwise no PMHx.    VSS, afebrile  Per Resident, on exam: pt in no acute distress; no vaginal bleeding noted; cx closed; benign gyn exam    H/h: 13.6/40.2; Plt: 236; HC.3; O+    Sono: Ut: 8.5x3.8x5.3cm; ET: 1.1cm and no IUP seen; ROV wnl w/ a 2.1cm corpus luteum; LOV wnl; no evidence of ectopic.    A/P  -31y/o , at 4.5wks by of LMP 22, presented with vaginal spotting and no IUP seen on sono.  -Pt with early pregnancy of unknown location at this time and threatened ab as pt spotting vs. miscarriage of early IUP.  Ectopic pregnancy cannot be r/o at this time as HCG below discriminatory zone.  -Recommend f/u for repeat HCG in 48hrs to trend with repeat sono.  -Ectopic and bleeding precautions given.    Thank you for the consult; please call if any further questions.    Dr. Gates

## 2022-08-04 NOTE — ED ADULT NURSE NOTE - OBJECTIVE STATEMENT
33 y/o  F with PSHx of ovarian torsion presents to the ED with complaints of vaginal bleeding at approx. 5 weeks gestation. Pt states she developed light brown spotting 2 days ago with associated mild abdominal cramping. Pt notes pain is pulling sensation 2/10 in severity. Pregnancy confirmed with UHCG and beta HCG. No ultrasound done at this time. Patient denies fever, chills, headache, dizziness, chest pain, shortness of breath, nausea, vomiting, dysuria, hematuria, or leg swelling. Pt is AOx 4 and speaking coherently. <2s capillary refill. +S1S2. Breathing is unlabored, spontaneous, and symmetrical. Lung sounds clear throughout. Abdomen is soft, nondistended, and nontender. Laparoscopic abdominal scars noted. Bladder is nontender and nondistended. No pitting edema noted.

## 2022-08-05 LAB
CULTURE RESULTS: SIGNIFICANT CHANGE UP
SPECIMEN SOURCE: SIGNIFICANT CHANGE UP

## 2022-08-06 NOTE — CHART NOTE - NSCHARTNOTEFT_GEN_A_CORE
Called patient who got her bHCG drawn in [place] today at a [facility], does not know value yet. She will be in [place] on Monday and then is flying out for a work trip at noon. Advised patient to come to ED to get repeat bHCG on 8/8. Patient asked if she would make her flight if she came to the ED early and was told that we cannot predict wait times in the ED. Will continue to follow patient on beta list. Return precautions reviewed with patient.    LISA Carter, PGY2

## 2022-08-08 ENCOUNTER — EMERGENCY (EMERGENCY)
Facility: HOSPITAL | Age: 32
LOS: 1 days | Discharge: HOME | End: 2022-08-08
Attending: EMERGENCY MEDICINE
Payer: COMMERCIAL

## 2022-08-08 VITALS
RESPIRATION RATE: 18 BRPM | WEIGHT: 164.91 LBS | DIASTOLIC BLOOD PRESSURE: 75 MMHG | SYSTOLIC BLOOD PRESSURE: 112 MMHG | HEART RATE: 96 BPM | OXYGEN SATURATION: 100 % | TEMPERATURE: 98 F | HEIGHT: 63 IN

## 2022-08-08 VITALS
DIASTOLIC BLOOD PRESSURE: 60 MMHG | RESPIRATION RATE: 18 BRPM | TEMPERATURE: 98 F | HEART RATE: 85 BPM | SYSTOLIC BLOOD PRESSURE: 117 MMHG | OXYGEN SATURATION: 98 %

## 2022-08-08 PROBLEM — E28.2 POLYCYSTIC OVARIAN SYNDROME: Chronic | Status: ACTIVE | Noted: 2022-08-04

## 2022-08-08 LAB — HCG SERPL-ACNC: 2568 MIU/ML — HIGH

## 2022-08-08 PROCEDURE — 84702 CHORIONIC GONADOTROPIN TEST: CPT

## 2022-08-08 PROCEDURE — 76817 TRANSVAGINAL US OBSTETRIC: CPT

## 2022-08-08 PROCEDURE — 99284 EMERGENCY DEPT VISIT MOD MDM: CPT | Mod: 25

## 2022-08-08 PROCEDURE — 76817 TRANSVAGINAL US OBSTETRIC: CPT | Mod: 26

## 2022-08-08 PROCEDURE — 99284 EMERGENCY DEPT VISIT MOD MDM: CPT

## 2022-08-08 PROCEDURE — 36415 COLL VENOUS BLD VENIPUNCTURE: CPT

## 2022-08-08 NOTE — ED PROVIDER NOTE - PATIENT PORTAL LINK FT
You can access the FollowMyHealth Patient Portal offered by [facility] by registering at the following website: [email or web address]. By joining [employer]’s FollowMyHealth portal, you will also be able to view your health information using other applications (apps) compatible with our system.

## 2022-08-08 NOTE — ED ADULT TRIAGE NOTE - CHIEF COMPLAINT QUOTE
Pt recently seen in ED for vaginal bleeding in pregnancy. Told to return to ED for repeat HCG. LMP 7/1/2022. Now reporting scant vaginal discharge.

## 2022-08-08 NOTE — ED PROVIDER NOTE - NSFOLLOWUPINSTRUCTIONS_ED_ALL_ED_FT
Follow up with the OBSTETRICS CLINIC, they will call you to make an appointment in 24-48 hours. Call the Emergency Department if you have difficulties getting your appointment.     Immediately return to the Emergency Department for any new or markedly worsening symptoms.

## 2022-08-08 NOTE — ED PROVIDER NOTE - PROGRESS NOTE DETAILS
TVUS confirms IUP. Patient is safe for d/c with supportive care, return precautions, and outpt f/u w OBGYN for further pregnancy care.

## 2022-08-08 NOTE — ED PROVIDER NOTE - OBJECTIVE STATEMENT
Pt  with LMP  here for pregnancy check. pt had been having vaginal spotting and abd cramping and was seen here on  and had hcg ~500 and a TVUS without clear IUP and was instructed on serial hcg and re-eval. pt went to OSH in [place] 2 days later and reportedly there had hcg ~1000 and U/S still indeterminate. pt is here for further eval as it has now been 2 more days. pt still with mild cramping and spotting. denies any other symptoms.

## 2022-08-08 NOTE — ED PROVIDER NOTE - CLINICAL SUMMARY MEDICAL DECISION MAKING FREE TEXT BOX
Dr. Wilkins's Note: Pt presents for further eval of preg of yet undetermined location. on exam, pt well appearing, NAD, soft abd non-tender. labs show appropriately increasing hcg. pending TVUS to assess for IUP and r/o other early pregnancy complicatons such as ectopic/miscarriage.

## 2022-08-08 NOTE — ED PROVIDER NOTE - PHYSICAL EXAMINATION
GEN: Well Appearing, Nontoxic, NAD  HEENT: NC/AT, MMM  Neck: supple  CV: reg rate  RESP: normal WOB, no distress  ABD: non-distended, non-tender  EXT/MSK:  FROMx4. No lower extremity edema.  SKIN: well perfused  Neuro: Grossly intact, AOX3

## 2022-08-08 NOTE — ED ADULT NURSE NOTE - NS ED NOTE ABUSE RESPONSE YN
Irvin Weeks is a 8 month old male presenting with dad for fever and fourth round of antibiotics (last taken Friday) for ear infection. Tubes are scheduled to be put in Wednesday. Recurrent ear infections since April. Z pack was the last antibiotic he was on.  Runny nose just started,  yesterday said he was pulling at ears.    OTC Meds: Ibuprofen and tylenol (11:00 a.m.)  Fever: 102 degrees yesterday, got as high as 103 degrees.  Activity: Clingy and tired  Appetite: Dinner was tolerated well. Breast feeding well today, but no solids today.   Wet diapers are normal.     Denies known latex allergy  Allergies and medications reviewed and verified    Parent/Guardian would like communication of their results via:        Cell Phone:   Telephone Information:   Mobile [phone]     Okay to leave a message containing results? Yes    There are no preventive care reminders to display for this patient.    Patient is up to date, no discussion needed.              Yes

## 2022-08-08 NOTE — ED PROVIDER NOTE - CHIEF COMPLAINT
The patient is a 32y Female complaining of  The patient is a 32y Female complaining of pregnancy check.

## 2022-08-08 NOTE — ED ADULT NURSE NOTE - OBJECTIVE STATEMENT
pt is pregnant; lmp 2022; ; seen here on Thursday night and is here for HCG follow-up; c/o mild bloating, "slight" brown discharge; and few pelvic "aches"; pt is alert and fully oriented; spouse at bedside; hx ovarian cyst.

## 2022-08-09 NOTE — CHART NOTE - NSCHARTNOTEFT_GEN_A_CORE
R1 GYN note:    Patient was seen on 8/8 in [facility] ED.  As per ED note, patient with rising bHCG (515.3->2568) and TVUS confirming intrauterine gestation.  Patient to follow up with [facility] OB clinic for continuation of care.    Patient removed from beta list.  GYN signed off.     Stephanie Gomez, PGY1
